# Patient Record
Sex: MALE
[De-identification: names, ages, dates, MRNs, and addresses within clinical notes are randomized per-mention and may not be internally consistent; named-entity substitution may affect disease eponyms.]

---

## 2019-12-26 ENCOUNTER — HOSPITAL ENCOUNTER (EMERGENCY)
Dept: HOSPITAL 25 - UCEAST | Age: 64
Discharge: HOME | End: 2019-12-26
Payer: COMMERCIAL

## 2019-12-26 VITALS — DIASTOLIC BLOOD PRESSURE: 89 MMHG | SYSTOLIC BLOOD PRESSURE: 143 MMHG
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
## 2019-12-26 DIAGNOSIS — S16.1XXA: Primary | ICD-10-CM
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
## 2019-12-26 DIAGNOSIS — Z87.891: ICD-10-CM
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
## 2019-12-26 PROCEDURE — G0463 HOSPITAL OUTPT CLINIC VISIT: HCPCS

## 2019-12-26 PROCEDURE — 99211 OFF/OP EST MAY X REQ PHY/QHP: CPT

## 2019-12-26 NOTE — UC
Back Pain HPI





- HPI Summary


HPI Summary: 





65 yo male presents with neck pain. He tells me that about 2-3 weeks ago he was 

cutting down a scott tree and the next day felt pain in his upper trapezius 

muscles b/l. He rested and took ibuprofen and felt better. When he stopped 

taking ibuprofen, his symptoms gradually returned. Reports that when he lays 

flat on his back his muscles will tighten and he feels it in his anterior 

shoulders. Takes a hour or so to "get loose" again. He saw his chiropractor and 

this helped. Yesterday he was doing a lot of lifting with garbage bags and 

various scott moving of objects - today woke with increased pain. He denies 

headache, dizziness, SOB, chest pain, palpitations, abdominal pain, n/v, 

numbness, or tingling. 





- History of Current Complaint


Chief Complaint: UCBackPain


Stated Complaint: BACK AND SHOULDER PAIN


Time Seen by Provider: 12/26/19 14:54


Hx Obtained From: Patient


Onset/Duration: Sudden Onset


Severity Initially: Moderate


Severity Currently: Moderate


Pain Intensity: 6


Pain Scale Used: 0-10 Numeric





- Allergies/Home Medications


Allergies/Adverse Reactions: 


 Allergies











Allergy/AdvReac Type Severity Reaction Status Date / Time


 


No Known Allergies Allergy   Verified 07/03/14 09:01














PMH/Surg Hx/FS Hx/Imm Hx


Psychological History: Anxiety





- Surgical History


Surgical History: None





- Family History


Known Family History: Positive: Cardiac Disease, Hypertension





- Social History


Lives: With Family


Alcohol Use: Daily


Alcohol Amount: couple beers a night


Substance Use Type: None


Smoking Status (MU): Former Smoker


Type: Cigarettes


Have You Smoked in the Last Year: No





- Immunization History


Most Recent Tetanus Shot: no recollection





Review of Systems


All Other Systems Reviewed And Are Negative: No


Constitutional: Positive: Negative


Skin: Positive: Negative


Respiratory: Positive: Negative


Cardiovascular: Positive: Negative


Gastrointestinal: Positive: Negative


Neurovascular: Positive: Negative


Musculoskeletal: Positive: Other: - Neck/shoulder pain


Neurological: Positive: Negative


Psychological: Positive: Negative





Physical Exam





- Summary


Physical Exam Summary: 





GENERAL: NAD. WDWN. No pain distress.


SKIN: No rashes, sores, lesions, or open wounds.


NECK: FROM. No lymphadenopathy. 


CHEST:  CTAB. No r/r/w. No accessory muscle use. Breathing comfortably and in 

no distress.


CV:  RRR. Pulses intact. Cap refill <2seconds. No JVD or carotid bruit. 


MSK: UPPER TRAPEZIUS b/l: Pain with turning neck side to side and with neck 

flexion. NTTP. Negative Spurlings. FROM b/l shoulders - flexion/internal or 

external rotation does not reproduce pain.


NEURO: Alert. Sensations intact C4-T1 b/l. 


PSYCH: Age appropriate behavior.


Triage Information Reviewed: Yes


Vital Signs: 


 Initial Vital Signs











Temp  99.0 F   12/26/19 14:38


 


Pulse  93   12/26/19 14:38


 


Resp  18   12/26/19 14:38


 


BP  143/89   12/26/19 14:38


 


Pulse Ox  99   12/26/19 14:38











Vital Signs Reviewed: Yes





Back Pain Course/Dx





- Course


Course Of Treatment: 





Pain is somewhat difficult to reproduce on exam as it seem to only be brought 

on by neck movements and not palpations or shoulder movements. 


He is having no SOB, chest pain, headaches, or dizziness. NSAIDs and 

chiropractor care are helping and reports stiffness if he lays still too long - 

favoring an MSK origin of his discomfort. 





Will try him with flexeriol and naproxen and encouraged him to f/u with his PCP 

for further eval.





If he develops SOB, chest pain, headache, dizziness, epigastric pain, n/v, or 

pain in between his shoulder blades to go to the ED immediately. 


Pt voiced understanding and agrees with the plan





- Differential Dx/Diagnosis


Provider Diagnosis: 


 Muscle strain








Discharge ED





- Sign-Out/Discharge


Documenting (check all that apply): Patient Departure


All imaging exams completed and their final reports reviewed: No Studies





- Discharge Plan


Condition: Stable


Disposition: HOME


Prescriptions: 


Cyclobenzaprine TAB* [Flexeril 10 MG TAB*] 10 mg PO BID PRN #14 tab


 PRN Reason: Spasms


Naproxen [Naproxen 500 mg tab] 500 mg PO BID PRN #30 tablet.


 PRN Reason: Pain - Mild


Patient Education Materials:  Muscle Strain (ED), Muscle Spasm (ED)


Referrals: 


Moisés Bolaños MD [Primary Care Provider] - 


Additional Instructions: 


If you develop a fever, shortness of breath, chest pain, new or worsening 

symptoms - please call your PCP or go to the ED immediately.


 





Your blood pressure was elevated at todays visit. Please see your primary 

provider within 4 weeks for recheck and re-evaluation.








Practice gentle range of motion exercises.











- Billing Disposition and Condition


Condition: STABLE


Disposition: Home





- Attestation Statements


Provider Attestation: 





I was available for consult. This patient was seen by the JANESSA. The patient was 

not presented to, seen by, or examined by me. -Lobo